# Patient Record
Sex: MALE
[De-identification: names, ages, dates, MRNs, and addresses within clinical notes are randomized per-mention and may not be internally consistent; named-entity substitution may affect disease eponyms.]

---

## 2018-04-02 ENCOUNTER — HOSPITAL ENCOUNTER (EMERGENCY)
Dept: HOSPITAL 31 - C.ER | Age: 38
Discharge: HOME | End: 2018-04-02
Payer: MEDICAID

## 2018-04-02 VITALS — HEART RATE: 79 BPM | SYSTOLIC BLOOD PRESSURE: 155 MMHG | DIASTOLIC BLOOD PRESSURE: 87 MMHG

## 2018-04-02 VITALS — TEMPERATURE: 98.9 F | RESPIRATION RATE: 18 BRPM

## 2018-04-02 DIAGNOSIS — M25.551: Primary | ICD-10-CM

## 2018-04-02 DIAGNOSIS — M25.561: ICD-10-CM

## 2018-04-02 NOTE — C.PDOC
History Of Present Illness





Patient presents to ED c/o right hip and knee pain for approx 2 weeks.  He 

denies any falls/injuries, fever, abdominal pain, nausea/vomiting/diarrhea, 

dysuria/hematuria, radiation of pain, sensory changes.  Patient has been trying 

icy hot and lidoderm patches without relief; no PO meds taken.


Time Seen by Provider: 04/02/18 17:22


Chief Complaint (Nursing): Lower Extremity Problem/Injury


History Per: Patient, Family (mother at bedside )


Onset/Duration Of Symptoms: Persistent (2 weeks )


Current Symptoms Are (Timing): Still Present


Severity: Moderate





Past Medical History


Reviewed: Historical Data, Nursing Documentation, Vital Signs


Vital Signs: 


 Last Vital Signs











Temp  98.9 F   04/02/18 17:26


 


Pulse  79   04/02/18 18:50


 


Resp  18   04/02/18 18:50


 


BP  155/87 H  04/02/18 18:50


 


Pulse Ox  100   04/05/18 13:50














- Medical History


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


Surgical History: No Surg Hx


Family History: States: No Known Family Hx





- Social History


Hx Alcohol Use: No


Hx Substance Use: No





- Immunization History


Hx Tetanus Toxoid Vaccination: No


Hx Influenza Vaccination: No


Hx Pneumococcal Vaccination: No





Review Of Systems


Except As Marked, All Systems Reviewed And Found Negative.


Cardiovascular: Negative for: Chest Pain


Respiratory: Negative for: Shortness of Breath


Gastrointestinal: Negative for: Nausea, Vomiting, Abdominal Pain, Diarrhea


Genitourinary: Negative for: Dysuria, Hematuria


Musculoskeletal: Positive for: Other (right hip and knee pain )


Neurological: Negative for: Weakness, Numbness





Physical Exam





- Physical Exam


Appears: Well, Non-toxic, In Acute Distress (in mild pain )


Head: Normacephalic


Eye(s): bilateral: Normal Inspection


Oral Mucosa: Moist


Cardiovascular: Rhythm Regular


Respiratory: Normal Breath Sounds, No Rales, No Rhonchi, No Wheezing


Back: Normal Inspection, No CVA Tenderness, No Vertebral Tenderness


Extremity: Tenderness (right hip and right knee TTP without deformity/swelling/

erythema), No Calf Tenderness, Capillary Refill (< 2 sec all digits ), No 

Deformity, No Swelling, Other (contracted/paralyzed RUE )


Pulses: Left Dorsalis Pedis: Normal, Right Dorsalis Pedis: Normal


Neurological/Psych: Oriented x3, Normal Sensation





ED Course And Treatment


O2 Sat by Pulse Oximetry: 100 (RA)


Pulse Ox Interpretation: Normal





- Other Rad


  ** right hip/pelvis Xray


X-Ray: Interpreted by Me, Viewed By Me (no fractures/dislocations, (+) 

arthritic changes )





  ** right knee Xray


X-Ray: Interpreted by Me, Viewed By Me (no fractures/dislocations, (+) 

arthritic changes )


Progress Note: Patient given PO Naprosyn.  Xrays of right hip and knee ordered 

and reviewed.


Reevaluation Time: 18:30


Reassessment Condition: Improved (On reassessment, patient is resting 

comfortably and states pain has improved.  He is able andrew ambulate normally in 

ED.  Xrays show arthritic changes.  Patient given Rxs for Naprosyn and Flexeril

, and was instructed to follow up with orthopedics within 1 week.  He 

understands he should return to ED if symptoms worsen.)





Disposition


Counseled Patient/Family Regarding: Studies Performed, Diagnosis, Need For 

Followup, Rx Given





- Disposition


Referrals: 


Baltazar Medina III, MD [Staff Provider] - 


[facility] [Outside]


[facility] [Outside]


Disposition: HOME/ ROUTINE


Disposition Time: 18:30


Condition: STABLE


Additional Instructions: 


FOLLOW UP WITH ORTHOPEDICS WITHIN 1 WEEK





USE MEDICATION AS NEEDED FOR PAIN





RETURN TO ER IF SYMPTOMS WORSEN 


Prescriptions: 


Cyclobenzaprine [Flexeril] 10 mg PO BID PRN #15 tab


 PRN Reason: Muscle Spasm


Naproxen [Naprosyn] 1 tab PO BID PRN #25 tab


 PRN Reason: Pain


Instructions:  Hip Pain (DC), Knee Pain (DC)


Forms:  [facility] (English), Work Excuse


Print Language: ENGLISH





- POA


Present On Arrival: None





- Clinical Impression


Clinical Impression: 


 Right hip pain, Right knee pain, Osteoarthritis

## 2018-04-03 NOTE — RAD
PROCEDURE:  Right Knee Radiographs.



HISTORY:

RIGHT KNEE PAIN



COMPARISON:

None.



FINDINGS:



BONES:

No fracture or lytic lesion. Minimal lateral tibial spine spurring 

noted. Probably femoral intercondylar microcystic change. 



JOINTS:

Minimal osteoarthritis 



JOINT EFFUSION:

None. 



OTHER FINDINGS:

None.



IMPRESSION:

Minimal osteoarthritis

## 2018-04-03 NOTE — RAD
PROCEDURE:  



HISTORY:

RIGHT HIP PAIN



COMPARISON:

None



TECHNIQUE:

AP view of the pelvis and applicable frog leg views obtained. 



FINDINGS:

There is a loss of the typical concavity at the superolateral right 

femoral head neck junction -morphology can be seen with femoral 

acetabular impingement syndrome.



Each bilateral superolateral hip joint space is narrowed with 

bilateral mild superolateral acetabular spurring.



IMPRESSION:

No fracture or lytic lesion.



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 

impingement syndrome. This is in addition to background mild 

osteoarthritic changes both hips

## 2018-04-05 VITALS — OXYGEN SATURATION: 100 %
